# Patient Record
Sex: FEMALE | Race: WHITE | URBAN - METROPOLITAN AREA
[De-identification: names, ages, dates, MRNs, and addresses within clinical notes are randomized per-mention and may not be internally consistent; named-entity substitution may affect disease eponyms.]

---

## 2017-02-01 ENCOUNTER — OFFICE VISIT (OUTPATIENT)
Dept: OBGYN | Facility: CLINIC | Age: 37
End: 2017-02-01
Payer: COMMERCIAL

## 2017-02-01 VITALS
WEIGHT: 151.6 LBS | HEIGHT: 63 IN | SYSTOLIC BLOOD PRESSURE: 110 MMHG | HEART RATE: 86 BPM | DIASTOLIC BLOOD PRESSURE: 74 MMHG | RESPIRATION RATE: 16 BRPM | BODY MASS INDEX: 26.86 KG/M2

## 2017-02-01 DIAGNOSIS — Z13.220 ENCOUNTER FOR LIPID SCREENING FOR CARDIOVASCULAR DISEASE: ICD-10-CM

## 2017-02-01 DIAGNOSIS — Z12.4 SCREENING FOR MALIGNANT NEOPLASM OF CERVIX: ICD-10-CM

## 2017-02-01 DIAGNOSIS — Z23 NEED FOR PROPHYLACTIC VACCINATION AND INOCULATION AGAINST INFLUENZA: ICD-10-CM

## 2017-02-01 DIAGNOSIS — Z13.29 SCREENING FOR THYROID DISORDER: ICD-10-CM

## 2017-02-01 DIAGNOSIS — Z13.6 ENCOUNTER FOR LIPID SCREENING FOR CARDIOVASCULAR DISEASE: ICD-10-CM

## 2017-02-01 DIAGNOSIS — Z11.3 SCREEN FOR STD (SEXUALLY TRANSMITTED DISEASE): ICD-10-CM

## 2017-02-01 DIAGNOSIS — Z01.411 ENCOUNTER FOR GYNECOLOGICAL EXAMINATION WITH ABNORMAL FINDING: Primary | ICD-10-CM

## 2017-02-01 DIAGNOSIS — Z23 NEED FOR TDAP VACCINATION: ICD-10-CM

## 2017-02-01 DIAGNOSIS — Z13.21 ENCOUNTER FOR VITAMIN DEFICIENCY SCREENING: ICD-10-CM

## 2017-02-01 LAB
MICRO REPORT STATUS: ABNORMAL
SPECIMEN SOURCE: ABNORMAL
TSH SERPL DL<=0.05 MIU/L-ACNC: 0.78 MU/L (ref 0.4–4)
WET PREP SPEC: ABNORMAL

## 2017-02-01 PROCEDURE — 90715 TDAP VACCINE 7 YRS/> IM: CPT | Performed by: ADVANCED PRACTICE MIDWIFE

## 2017-02-01 PROCEDURE — 87491 CHLMYD TRACH DNA AMP PROBE: CPT | Performed by: ADVANCED PRACTICE MIDWIFE

## 2017-02-01 PROCEDURE — 87210 SMEAR WET MOUNT SALINE/INK: CPT | Performed by: ADVANCED PRACTICE MIDWIFE

## 2017-02-01 PROCEDURE — 36415 COLL VENOUS BLD VENIPUNCTURE: CPT | Performed by: ADVANCED PRACTICE MIDWIFE

## 2017-02-01 PROCEDURE — 87340 HEPATITIS B SURFACE AG IA: CPT | Performed by: ADVANCED PRACTICE MIDWIFE

## 2017-02-01 PROCEDURE — 99385 PREV VISIT NEW AGE 18-39: CPT | Mod: 25 | Performed by: ADVANCED PRACTICE MIDWIFE

## 2017-02-01 PROCEDURE — G0124 SCREEN C/V THIN LAYER BY MD: HCPCS | Performed by: ADVANCED PRACTICE MIDWIFE

## 2017-02-01 PROCEDURE — 90472 IMMUNIZATION ADMIN EACH ADD: CPT | Performed by: ADVANCED PRACTICE MIDWIFE

## 2017-02-01 PROCEDURE — 99213 OFFICE O/P EST LOW 20 MIN: CPT | Mod: 25 | Performed by: ADVANCED PRACTICE MIDWIFE

## 2017-02-01 PROCEDURE — 87591 N.GONORRHOEAE DNA AMP PROB: CPT | Performed by: ADVANCED PRACTICE MIDWIFE

## 2017-02-01 PROCEDURE — 90686 IIV4 VACC NO PRSV 0.5 ML IM: CPT | Performed by: ADVANCED PRACTICE MIDWIFE

## 2017-02-01 PROCEDURE — 86780 TREPONEMA PALLIDUM: CPT | Performed by: ADVANCED PRACTICE MIDWIFE

## 2017-02-01 PROCEDURE — 87624 HPV HI-RISK TYP POOLED RSLT: CPT | Performed by: ADVANCED PRACTICE MIDWIFE

## 2017-02-01 PROCEDURE — 87389 HIV-1 AG W/HIV-1&-2 AB AG IA: CPT | Performed by: ADVANCED PRACTICE MIDWIFE

## 2017-02-01 PROCEDURE — 90471 IMMUNIZATION ADMIN: CPT | Performed by: ADVANCED PRACTICE MIDWIFE

## 2017-02-01 PROCEDURE — 82306 VITAMIN D 25 HYDROXY: CPT | Performed by: ADVANCED PRACTICE MIDWIFE

## 2017-02-01 PROCEDURE — G0145 SCR C/V CYTO,THINLAYER,RESCR: HCPCS | Performed by: ADVANCED PRACTICE MIDWIFE

## 2017-02-01 PROCEDURE — 84443 ASSAY THYROID STIM HORMONE: CPT | Performed by: ADVANCED PRACTICE MIDWIFE

## 2017-02-01 RX ORDER — METRONIDAZOLE 500 MG/1
500 TABLET ORAL 2 TIMES DAILY
Qty: 14 TABLET | Refills: 0 | Status: SHIPPED | OUTPATIENT
Start: 2017-02-01 | End: 2017-02-22

## 2017-02-01 NOTE — MR AVS SNAPSHOT
After Visit Summary   2/1/2017    Angela Vicente    MRN: [id number]           Patient Information     Date Of Birth          1980        Visit Information        Provider Department      2/1/2017 4:20 PM Nina Hamilton APRN CNM [facility]        Today's Diagnoses     Screen for STD (sexually transmitted disease)    -  1     Screening for malignant neoplasm of cervix         Need for Tdap vaccination         Need for prophylactic vaccination and inoculation against influenza           Care Instructions    Preventive Health Recommendations  Female Ages 21 - 39  Yearly exam:   See your health care provider every year in order to  1. Review health changes.  2. Discuss preventive care.    3. Review your medicines if you your provider has prescribed any.      You do not need a Pap test if your uterus was removed (hysterectomy) and you have not had cancer.    You should be tested each year for STIs (sexually transmitted diseases) if you're at risk.     Talk to your provider about how often to have your cholesterol checked, about every 5 years if normal.    If you are at risk for diabetes, you should have a diabetes test (fasting glucose).    Vitamin D deficiency screening and thyroid disease screening is also recommended every 3-5 years depending on risk factors or more often if symptomatic  PAP Smear:   Until age 30: Get a Pap test every three years (more often if you have had an abnormal result)    After age 30: Talk to your provider about whether you should have a Pap test every 3 years or have a Pap test with HPV screening every 5 years.   Shots: Get a flu shot each year. Get a tetanus shot every 10 years.   Nutrition:     Eat at least 5 servings of fruits and vegetables each day    Eat REAL food, stay away from processed foods.    Eat whole-grain bread, whole-wheat pasta and brown rice instead of white grains and rice.    For bone health:  Eat calcium-rich foods  or take calcium pills (500 to 600 mg) twice a day with food (1200 mg per day). Also take vitamin D (2000 IUs) each day.     Lifestyle    Exercise regularly (30 minutes a day, 5 days of the week). This will help you control your weight and prevent disease.    Weight bearing exercise such as weight lifting, walking, running and yoga will be beneficial later in life preventing osteoporosis     Limit alcohol to one drink per day.    No smoking.     Wear sunscreen to prevent skin cancer.    See your dentist every six months to one year for an exam and cleaning depending on their recommendation    Get an eye exam every two years or more often if you wear glasses or contacts.              Follow-ups after your visit        Who to contact     If you have questions or need follow up information about today's clinic visit or your schedule please contact [facility] directly at [phone].  Normal or non-critical lab and imaging results will be communicated to you by MyChart, letter or phone within 4 business days after the clinic has received the results. If you do not hear from us within 7 days, please contact the clinic through [employer] or phone. If you have a critical or abnormal lab result, we will notify you by phone as soon as possible.  Submit refill requests through [employer] or call your pharmacy and they will forward the refill request to us. Please allow 3 business days for your refill to be completed.          Additional Information About Your Visit        MyChart Information     [employer] gives you secure access to your electronic health record. If you see a primary care provider, you can also send messages to your care team and make appointments. If you have questions, please call your primary care clinic.  If you do not have a primary care provider, please call [phone] and they will assist you.        Care EveryWhere ID     This is your Care EveryWhere ID. This could be used by other  "organizations to access your [place] medical records  [id number]        Your Vitals Were     Pulse Respirations Height BMI (Body Mass Index) Last Period       86 16 5' 3.25\" (1.607 m) 26.63 kg/m2 01/15/2017 (Approximate)        Blood Pressure from Last 3 Encounters:   02/01/17 110/74   09/19/16 118/80   04/11/16 90/62    Weight from Last 3 Encounters:   02/01/17 151 lb 9.6 oz (68.765 kg)   09/19/16 149 lb 11.2 oz (67.903 kg)   04/11/16 151 lb 11.2 oz (68.811 kg)              We Performed the Following     ANTI TREPONEMA EIA     CHLAMYDIA TRACHOMATIS PCR     FLU VAC, SPLIT VIRUS IM > 3 YO (QUADRIVALENT) [12456]     HEPATITIS B SURFACE ANTIGEN     HIV Antigen Antibody Combo     HPV High Risk Types DNA Cervical     NEISSERIA GONORRHOEA PCR     Pap imaged thin layer screen with HPV - recommended age 30 - 65 years (select HPV order below)     TDAP (ADACEL AGES 11-64)     Vaccine Administration, Initial [42760]     WET PREP        Primary Care Provider Office Phone # Fax #    Deqa Meaghan Keys [phone]       [address] [facility] [address]        Thank you!     Thank you for choosing [facility]  for your care. Our goal is always to provide you with excellent care. Hearing back from our patients is one way we can continue to improve our services. Please take a few minutes to complete the written survey that you may receive in the mail after your visit with us. Thank you!             Your Updated Medication List - Protect others around you: Learn how to safely use, store and throw away your medicines at www.disposemymeds.org.          This list is accurate as of: 2/1/17  5:02 PM.  Always use your most recent med list.                   Brand Name Dispense Instructions for use    ALEVE 220 MG tablet   Generic drug:  naproxen sodium      Take 220 mg by mouth 2 times daily (with meals)       gabapentin 100 MG capsule    NEURONTIN    90 capsule "    Take 1 capsule (100 mg) by mouth 3 times daily       Lara 500 MG Caps      Take 2 tablets by mouth daily       METHADONE HCL PO      Take 95 mLs by mouth daily [facility]       order for DME     1 Device    Equipment being ordered: left wrist brace       * venlafaxine 37.5 MG Tb24 24 hr tablet    EFFEXOR-ER    46 tablet    Take 1 tablet daily for 14 days, then  Take 2 tablets daily       * venlafaxine 75 MG Tb24 24 hr tablet    EFFEXOR-ER    30 tablet    Take 1 tablet (75 mg) by mouth daily       * Notice:  This list has 2 medication(s) that are the same as other medications prescribed for you. Read the directions carefully, and ask your doctor or other care provider to review them with you.

## 2017-02-01 NOTE — PATIENT INSTRUCTIONS
Preventive Health Recommendations  Female Ages 21 - 39  Yearly exam:   See your health care provider every year in order to  1. Review health changes.  2. Discuss preventive care.    3. Review your medicines if you your provider has prescribed any.      You do not need a Pap test if your uterus was removed (hysterectomy) and you have not had cancer.    You should be tested each year for STIs (sexually transmitted diseases) if you're at risk.     Talk to your provider about how often to have your cholesterol checked, about every 5 years if normal.    If you are at risk for diabetes, you should have a diabetes test (fasting glucose).    Vitamin D deficiency screening and thyroid disease screening is also recommended every 3-5 years depending on risk factors or more often if symptomatic  PAP Smear:   Until age 30: Get a Pap test every three years (more often if you have had an abnormal result)    After age 30: Talk to your provider about whether you should have a Pap test every 3 years or have a Pap test with HPV screening every 5 years.   Shots: Get a flu shot each year. Get a tetanus shot every 10 years.   Nutrition:     Eat at least 5 servings of fruits and vegetables each day    Eat REAL food, stay away from processed foods.    Eat whole-grain bread, whole-wheat pasta and brown rice instead of white grains and rice.    For bone health:  Eat calcium-rich foods or take calcium pills (500 to 600 mg) twice a day with food (1200 mg per day). Also take vitamin D (2000 IUs) each day.     Lifestyle    Exercise regularly (30 minutes a day, 5 days of the week). This will help you control your weight and prevent disease.    Weight bearing exercise such as weight lifting, walking, running and yoga will be beneficial later in life preventing osteoporosis     Limit alcohol to one drink per day.    No smoking.     Wear sunscreen to prevent skin cancer.    See your dentist every six months to one year for an exam and cleaning  depending on their recommendation    Get an eye exam every two years or more often if you wear glasses or contacts.

## 2017-02-01 NOTE — PROGRESS NOTES
Quick Note:    Results discussed directly with patient while patient was present. Any further details documented in the note.   CAROLYN Tovar CNM  ______

## 2017-02-01 NOTE — NURSING NOTE
"Chief Complaint   Patient presents with     Physical       Initial /74 mmHg  Pulse 86  Resp 16  Ht 5' 3.25\" (1.607 m)  Wt 151 lb 9.6 oz (68.765 kg)  BMI 26.63 kg/m2  LMP 01/15/2017 (Approximate) Estimated body mass index is 26.63 kg/(m^2) as calculated from the following:    Height as of this encounter: 5' 3.25\" (1.607 m).    Weight as of this encounter: 151 lb 9.6 oz (68.765 kg).  BP completed using cuff size: regular        The following HM Due: pap smear  Pt here for physical exam  Not sure when last pap /no Hx abnormal  Has hx irreg menses  spots for a few days than a few weeks later bleeds  Has been going on for years  Has a IUD thinks might be a Paraguard. Not sure when was inserted  Is a recovering drug addict  Doing good  Dora Call LPN                      "

## 2017-02-01 NOTE — PROGRESS NOTES
Virginia is a 36 year old  female who presents for annual exam.     HPI:  Besides routine health maintenance, she would like to discuss her IUD, not sure how long it has been placed or what kind it was. She reports is was placed after she had an  in  when she was still using heroin. Has had some irregular spotting and bleeding between cycles for the past couple years. Would like to discuss other options.  She is in a supportive relationship. She feels she is finally doing well, managed addiction with methadone clinic.  Unsure of last pap and STD screening.  Mood is stable with medications and management by psychiatry.  Menses are regular q 28-30 days and with spotting at the beginning of the month and menses mid month  crampy and heavy lasting 6 days.   Menses flow: medium and heavy.    Patient's last menstrual period was 01/15/2017 (approximate)..   Using IUD for contraception.    She is not currently considering pregnancy.    REPRODUCTIVE/GYNECOLOGIC HISTORY:  Virginia is sexually active with male partner(s) and is currently in monogamous relationship.   STI testing offered?  Accepted  History of abnormal Pap smear:  unsure  SOCIAL HISTORY  Abuse: does not report having previously been physical or sexually abused.    Do you feel safe in your environment? YES    She  reports that she has quit smoking. Her smoking use included Cigarettes. She has never used smokeless tobacco.    STD testing offered?  Accepted  Alcohol Score = 0    HEALTH MAINTENANCE:  Regular Self Breast Exams: No  TSH: (  TSH   Date Value Ref Range Status   2012 0.27* 0.4 - 5.0 mU/L Final    )  Pap; (No results found for this basename: pap )  Immunizations up to date: yes, done today  Health maintenance updated:  yes    HEALTHY HABITS  Eating habits: eats regular meals  Calcium/Vitamin D intake: source:  none Adequate?   Exercise: How often do you exercise? 3-5 times/week; going to the gym  Have you had an eye exam in the  last two years? No will f/u with insurance and see where would be covered  Do you routinely see the dentist once or twice yearly? No, also on her list of things to do      HISTORY:  Obstetric History       T0      TAB0   SAB0   E0   M0   L0       # Outcome Date GA Lbr Solitario/2nd Weight Sex Delivery Anes PTL Lv   1 AB                 Past Medical History   Diagnosis Date     Concussion      high school cheer leading     Depression with anxiety 2016     Heroin addiction (H)- on methadone rx ([facility]) 2016     Past Surgical History   Procedure Laterality Date     Right uteral stent       Laser holmium lithotripsy ureter(s), insert stent, combined  2012     Procedure: COMBINED CYSTOSCOPY, URETEROSCOPY, LASER HOLMIUM LITHOTRIPSY URETER(S), INSERT STENT;  CYSTOSCOPY, RIGHT URETEROSCOPY, HOLMIUM LASER, RIGHT URETERAL STONE EXTRACTION , RIGHT RETROGRADES AND RIGHT STENT PLACEMENT;  Surgeon: Gil Bar MD;  Location:  OR     Cystoscopy, retrogrades, extract stone, combined  2012     Procedure: COMBINED CYSTOSCOPY, RETROGRADES, EXTRACT STONE;;  Surgeon: Gil Bar MD;  Location:  OR     ----------incision and drainage       left wrist      Family History   Problem Relation Age of Onset     Depression Mother      PTSD anxiety     Anxiety Disorder Mother      Bipolar Disorder Mother      Alcohol/Drug Father      Depression Father      Anxiety Disorder Father      Substance Abuse Father      HEART DISEASE Paternal Grandfather      Anxiety Disorder Sister      Social History     Social History     Marital Status: Single     Spouse Name: N/A     Number of Children: N/A     Years of Education: N/A     Social History Main Topics     Smoking status: Former Smoker     Types: Cigarettes     Smokeless tobacco: Never Used     Alcohol Use: No     Drug Use: No      Comment: heroin - stopped heroin in 2015 and was put on Methadone      Sexual Activity: Not Asked  "    Other Topics Concern     None     Social History Narrative       Current outpatient prescriptions:      metroNIDAZOLE (FLAGYL) 500 MG tablet, Take 1 tablet (500 mg) by mouth 2 times daily, Disp: 14 tablet, Rfl: 0     naproxen sodium (ALEVE) 220 MG tablet, Take 220 mg by mouth 2 times daily (with meals), Disp: , Rfl:      Lara 500 MG CAPS, Take 2 tablets by mouth daily, Disp: , Rfl:      gabapentin (NEURONTIN) 100 MG capsule, Take 1 capsule (100 mg) by mouth 3 times daily, Disp: 90 capsule, Rfl: 1     venlafaxine (EFFEXOR-ER) 37.5 MG TB24, Take 1 tablet daily for 14 days, then  Take 2 tablets daily, Disp: 46 tablet, Rfl: 0     METHADONE HCL PO, Take 95 mLs by mouth daily [facility], Disp: , Rfl:      venlafaxine (EFFEXOR-ER) 75 MG TB24, Take 1 tablet (75 mg) by mouth daily, Disp: 30 tablet, Rfl: 1     order for DME, Equipment being ordered: left wrist brace, Disp: 1 Device, Rfl: 0   No Known Allergies    Past medical, surgical, social and family history were reviewed and updated in EPIC.    ROS:   12 point review of systems negative other than symptoms noted below.  Genitourinary: Irregular Menses    PHYSICAL EXAM:  /74 mmHg  Pulse 86  Resp 16  Ht 5' 3.25\" (1.607 m)  Wt 151 lb 9.6 oz (68.765 kg)  BMI 26.63 kg/m2  LMP 01/15/2017 (Approximate)   BMI: Body mass index is 26.63 kg/(m^2).  Constitutional: healthy, alert and no distress  Neck: symmetrical, thyroid normal size, no masses present, lymphadenopathy present.   Cardiovascular: RRR, no murmurs present  Respiratory: breathing unlabored, lungs CTA bilaterally  Breast:normal without masses, tenderness or nipple discharge and no palpable axillary masses or adenopathy  Gastrointestinal: abdomen soft, non-tender, bowel sounds present  PELVIC EXAM:  Vulva: No lesions, no adenopathy, BUS WNL  Vagina: Moist, pink, discharge consistent with BV and malodorous  well rugated, no lesions  Cervix:smooth, pink, no visible lesions, pap/GC " obtained, IUD strings visualized about 5 cm out of os  Uterus: Normal size, non-tender, mobile  Ovaries: No masses palpated  Rectal exam: deferred    ASSESSMENT/PLAN:    ICD-10-CM    1. Encounter for gynecological examination with abnormal finding Z01.411 metroNIDAZOLE (FLAGYL) 500 MG tablet   2. Screen for STD (sexually transmitted disease) Z11.3 WET PREP     NEISSERIA GONORRHOEA PCR     HEPATITIS B SURFACE ANTIGEN     HIV Antigen Antibody Combo     ANTI TREPONEMA EIA     CHLAMYDIA TRACHOMATIS PCR   3. Screening for malignant neoplasm of cervix Z12.4 Pap imaged thin layer screen with HPV - recommended age 30 - 65 years (select HPV order below)     HPV High Risk Types DNA Cervical   4. Need for Tdap vaccination Z23 TDAP (ADACEL AGES 11-64)   5. Need for prophylactic vaccination and inoculation against influenza Z23 FLU VAC, SPLIT VIRUS IM > 3 YO (QUADRIVALENT) [35439]     Vaccine Administration, Initial [46956]   6. Encounter for vitamin deficiency screening Z13.21 Vitamin D Deficiency   7. Screening for thyroid disorder Z13.29 TSH   8. Encounter for lipid screening for cardiovascular disease Z13.220 Lipid Profile (Chol, Trig, HDL, LDL calc)    Z13.6      Results for orders placed or performed in visit on 02/01/17   WET PREP   Result Value Ref Range    Specimen Description Vagina     Wet Prep (A)      No Trichomonas seen  Clue cells seen  No yeast seen      Micro Report Status FINAL 02/01/2017          COUNSELING:   Reviewed preventive health counseling, as reflected in patient instructions       Regular exercise       Healthy diet/nutrition       Vision screening       Hearing screening       Vaccinated for: Influenza and TDAP       Contraception       Family planning       Osteoporosis Prevention/Bone Health       Safe sex practices/STD prevention  Plans to return to clinic in 2 weeks for Nexplanon placement, handout provided  Until appointment use condoms  Encouraged to make dental and vision  "appointments  Recommend starting multivitamin, vitamin d, and fish oil   Encouraged to continue exercise routine  +BV on wet prep, abstain from intercourse while treating BV, patient does not drink alcohol  Plans lipid panel fasting with nexplanon placement    IUD removal note   IUD removal requested by patient during annual exam.   She does not know which type of IUD but believes it was placed around 2012. Plans nexplanon placement in 2 weeks    BP 98/58 mmHg  Pulse 68  Ht 5' 5\" (1.651 m)  Wt 180 lb (81.647 kg)  BMI 29.95 kg/m2  SpO2 100%  LMP 12/15/2016    PELVIC EXAM:  Vulva: No external lesions, normal hair distribution, no adenopathy, BUS WNL  Vagina: Moist, pink, no abnormal discharge, well rugated, no lesions  Cervix: visualized anteriorly, string visualized about 5 cm, smooth, pink, no visible lesions, neg CMT     PROCEDURE: String grasped with ring forceps and removed without difficuly  Patient tolerated procedure well without pain or discomfort    PLAN/COUNSELING:  See above               45 minutes was spent face to face with the patient today discussing her history, diagnosis, and follow-up plan as noted above. Over 50% of the visit was spent in counseling and coordination of care.    Total Visit Time: 45 minutes.         CAROLYN Castle, MICHAEL            "

## 2017-02-01 NOTE — PROGRESS NOTES
Injectable Influenza Immunization Documentation    1.  Is the person to be vaccinated sick today?  No    2. Does the person to be vaccinated have an allergy to eggs or to a component of the vaccine?  No    3. Has the person to be vaccinated today ever had a serious reaction to influenza vaccine in the past?  No    4. Has the person to be vaccinated ever had Guillain-[place] syndrome?  No     Form completed by Dora Call LPN

## 2017-02-01 NOTE — LETTER
[address] [address]  [phone]        February 6, 2018    Angela Vicente  [address]  [facility] [address]              Dear Angela Vicente    This is to remind you that your fasting labs is due.    You may call our office at [phone] to schedule an appointment.    Please disregard this notice if you have already had your labs drawn or made an appointment.        Sincerely,        Nina Hamilton MD

## 2017-02-03 LAB
C TRACH DNA SPEC QL NAA+PROBE: NORMAL
DEPRECATED CALCIDIOL+CALCIFEROL SERPL-MC: 22 UG/L (ref 20–75)
HBV SURFACE AG SERPL QL IA: NONREACTIVE
HIV 1+2 AB+HIV1 P24 AG SERPL QL IA: NORMAL
N GONORRHOEA DNA SPEC QL NAA+PROBE: NORMAL
SPECIMEN SOURCE: NORMAL
SPECIMEN SOURCE: NORMAL
T PALLIDUM IGG+IGM SER QL: NEGATIVE

## 2017-02-07 LAB
COPATH REPORT: NORMAL
PAP: NORMAL

## 2017-02-09 LAB
FINAL DIAGNOSIS: NORMAL
HPV HR 12 DNA CVX QL NAA+PROBE: NEGATIVE
HPV16 DNA SPEC QL NAA+PROBE: NEGATIVE
HPV18 DNA SPEC QL NAA+PROBE: NEGATIVE
SPECIMEN DESCRIPTION: NORMAL

## 2017-02-22 ENCOUNTER — OFFICE VISIT (OUTPATIENT)
Dept: OBGYN | Facility: CLINIC | Age: 37
End: 2017-02-22
Payer: COMMERCIAL

## 2017-02-22 VITALS
DIASTOLIC BLOOD PRESSURE: 64 MMHG | WEIGHT: 146 LBS | OXYGEN SATURATION: 98 % | BODY MASS INDEX: 25.66 KG/M2 | SYSTOLIC BLOOD PRESSURE: 106 MMHG | HEART RATE: 78 BPM

## 2017-02-22 DIAGNOSIS — Z30.017 NEXPLANON INSERTION: Primary | ICD-10-CM

## 2017-02-22 DIAGNOSIS — Z30.017 ENCOUNTER FOR INITIAL PRESCRIPTION OF NEXPLANON: ICD-10-CM

## 2017-02-22 LAB — HCG, QUAL URINE: NEGATIVE

## 2017-02-22 PROCEDURE — 84703 CHORIONIC GONADOTROPIN ASSAY: CPT | Performed by: NURSE PRACTITIONER

## 2017-02-22 PROCEDURE — 11981 INSERTION DRUG DLVR IMPLANT: CPT | Performed by: NURSE PRACTITIONER

## 2017-02-22 RX ORDER — LAMOTRIGINE 200 MG/1
200 TABLET ORAL DAILY
COMMUNITY
Start: 2017-01-30

## 2017-02-22 NOTE — PATIENT INSTRUCTIONS
What Nexplanon Users May Expect    Nexplanon is well tolerated and has a low early-removal rate.    Insertion site complications, such as prolonged pain or infection, are rare. Removal is occasionally difficult, and rarely requires a surgical procedure in the operating room.    Menstrual changes are common with Nexplanon. Bleeding may become more or less frequent or heavy, or absent. The bleeding pattern after the first three months is predictive of future bleeding, but the pattern may change at any time. Average bleeding is 18 days over 3 months. Over 50% of women experience rare over absent bleeding over the two year period, while 25% experience frequent or prolonged bleeding.    In clinical studies, users gained 3.7 pounds over two years. It is unknown what portion of this weight gain is related to Nexplanon    Women with a history of depressed mood may have worsening on Nexplanon, and may need to have the device removed.    Return to baseline ovulation patterns is seen 7-14 days after removal of Nexplanon.    Rarely, headaches and acne have also led to device removal.    Nexplanon may be less effective in women who weight more than 130% of their ideal body weight.    Nexplanon does not protect against HIV or STDs.    Use a back-up method of birth control for the first 7 days after insertion of Nexplanon.    Please call [facility] at [phone] if you have questions or concerns.    For more complete information:  [email or web address]

## 2017-02-22 NOTE — NURSING NOTE
"Chief Complaint   Patient presents with     Contraception   Was considering Nexplanon but now possibly would like Paraguard IUD due to changes in mood from hormones  Initial /64  Pulse 78  Wt 146 lb (66.2 kg)  LMP 02/21/2017 (Exact Date)  SpO2 98%  BMI 25.66 kg/m2 Estimated body mass index is 25.66 kg/(m^2) as calculated from the following:    Height as of 2/1/17: 5' 3.25\" (1.607 m).    Weight as of this encounter: 146 lb (66.2 kg).  Medication Reconciliation: complete   Dina Plunkett MA      "

## 2017-02-22 NOTE — PROGRESS NOTES
NEXPLANON INSERTION PROCEDURE    Angela Vicente is a 36 year old  who presents for Nexplanon insertion. The  patient's last menstrual period was 2017.  The patient is currently using Nothing  for contraception.  She had Mirena IUD removed approximately 1 month ago.    Tests:  Results for orders placed or performed in visit on 17 (from the past 24 hour(s))   HCL HCG, URINE, NURSE BACKOFFICE   Result Value Ref Range    hCG, Qual Urine negative      A complete discussion of the risks and benefits of nexplanon use and the details of the insertion procedure was held with the patient.  All questions were answered.  A consent form was signed.      Prior to the beginning of the procedure the team paused to verify the patient's identity, as well as the procedure to be performed and the correct side/site.  All equipment required was ready and available. The patient was positioned appropriately.     Preprocedure medications: 1% plain lidocaine, 1-2 ml  Nexplanon Lot # [id number]    Patients allergies were confirmed.  The patient was placed in the supine position with her right (non-dominant) arm flexed at the elbow, externally rotated, and placed with her wrist parallel to her ear.  The insertion site was identified 6-8 cm above the elbow crease at the inner aspect overlying the bicepital groove.  The insertion site was marked with a sterile marker. The direction of insertion was also indicated with a jewell 6-8 cm proximal in the bicepital groove.  The insertion area was cleaned with betadine swabs and anesthetized with 2 cc of 1% lidocaine without epinephrine.  The Nexplanon was removed from its blister.  With the shield on, the joshua was visible inside the needle tip.  The needle shield was removed.  Counter-traction was applied to the skin at the marked needle insertion site.  The tip of the needle was inserted at the site at a slight angle.  The applicator was then lowered to a horizontal position.  The  needle was inserted to its full length, keeping the needle parallel to the surface of the skin and the skin tented. The applicator button was pressed and the the needle retracted within the device leaving the Nexplanon joshua under the skin.  The 4 cm joshua was palpated under the skin.  The patient also palpated the joshua.  A pressure bandage was applied with sterile gauze. The patient was instructed to remove the bandage in several hours and replace with a band-aid.    The user card was filled out and given to the patient to keep.  The Patient Chart Label was completed and sent for scanning.    PLAN:   The patient was asked to contact the clinic for any fever/chills/severe pelvic or abdominal pain or heavy bleeding.     FOLLOW-UP:  She was asked to follow up for any problems.   Patient advised to use back-up birth control for 7 days.    Jenny LEON CNM, [facility]NP[facility]

## 2017-02-22 NOTE — MR AVS SNAPSHOT
After Visit Summary   2/22/2017    Angela Vicente    MRN: [id number]           Patient Information     Date Of Birth          1980        Visit Information        Provider Department      2/22/2017 10:30 AM Jenny Bonds APRN [facility] Instructions    What Nexplanon Users May Expect    Nexplanon is well tolerated and has a low early-removal rate.    Insertion site complications, such as prolonged pain or infection, are rare. Removal is occasionally difficult, and rarely requires a surgical procedure in the operating room.    Menstrual changes are common with Nexplanon. Bleeding may become more or less frequent or heavy, or absent. The bleeding pattern after the first three months is predictive of future bleeding, but the pattern may change at any time. Average bleeding is 18 days over 3 months. Over 50% of women experience rare over absent bleeding over the two year period, while 25% experience frequent or prolonged bleeding.    In clinical studies, users gained 3.7 pounds over two years. It is unknown what portion of this weight gain is related to Nexplanon    Women with a history of depressed mood may have worsening on Nexplanon, and may need to have the device removed.    Return to baseline ovulation patterns is seen 7-14 days after removal of Nexplanon.    Rarely, headaches and acne have also led to device removal.    Nexplanon may be less effective in women who weight more than 130% of their ideal body weight.    Nexplanon does not protect against HIV or STDs.    Use a back-up method of birth control for the first 7 days after insertion of Nexplanon.    Please call [facility] at [phone] if you have questions or concerns.    For more complete information:  [email or web address]        Follow-ups after your visit        Who to contact     If you have questions or need follow up  information about today's clinic visit or your schedule please contact [facility] directly at [phone].  Normal or non-critical lab and imaging results will be communicated to you by MyChart, letter or phone within 4 business days after the clinic has received the results. If you do not hear from us within 7 days, please contact the clinic through [employer] or phone. If you have a critical or abnormal lab result, we will notify you by phone as soon as possible.  Submit refill requests through [employer] or call your pharmacy and they will forward the refill request to us. Please allow 3 business days for your refill to be completed.          Additional Information About Your Visit        [employer] Information     [employer] gives you secure access to your electronic health record. If you see a primary care provider, you can also send messages to your care team and make appointments. If you have questions, please call your primary care clinic.  If you do not have a primary care provider, please call [phone] and they will assist you.        Care EveryWhere ID     This is your Care EveryWhere ID. This could be used by other organizations to access your [facility] medical records  [id number]        Your Vitals Were     Pulse Last Period Pulse Oximetry BMI (Body Mass Index)          78 02/21/2017 (Exact Date) 98% 25.66 kg/m2         Blood Pressure from Last 3 Encounters:   02/22/17 106/64   02/01/17 110/74   09/19/16 118/80    Weight from Last 3 Encounters:   02/22/17 146 lb (66.2 kg)   02/01/17 151 lb 9.6 oz (68.8 kg)   09/19/16 149 lb 11.2 oz (67.9 kg)              Today, you had the following     No orders found for display       Primary Care Provider Office Phone # Fax #    Deqa Meaghan Keys [phone]       [facility] [address]  [place] [address]        Thank you!     Thank you for choosing [facility]  for your care. Our  goal is always to provide you with excellent care. Hearing back from our patients is one way we can continue to improve our services. Please take a few minutes to complete the written survey that you may receive in the mail after your visit with us. Thank you!             Your Updated Medication List - Protect others around you: Learn how to safely use, store and throw away your medicines at www.disposemymeds.org.          This list is accurate as of: 2/22/17 11:35 AM.  Always use your most recent med list.                   Brand Name Dispense Instructions for use    ALEVE 220 MG tablet   Generic drug:  naproxen sodium      Take 220 mg by mouth 2 times daily (with meals)       gabapentin 100 MG capsule    NEURONTIN    90 capsule    Take 1 capsule (100 mg) by mouth 3 times daily       Lara 500 MG Caps      Take 2 tablets by mouth daily       METHADONE HCL PO      Take 95 mLs by mouth daily [facility]       metroNIDAZOLE 500 MG tablet    FLAGYL    14 tablet    Take 1 tablet (500 mg) by mouth 2 times daily       order for DME     1 Device    Equipment being ordered: left wrist brace       * venlafaxine 37.5 MG Tb24 24 hr tablet    EFFEXOR-ER    46 tablet    Take 1 tablet daily for 14 days, then  Take 2 tablets daily       * venlafaxine 75 MG Tb24 24 hr tablet    EFFEXOR-ER    30 tablet    Take 1 tablet (75 mg) by mouth daily       * Notice:  This list has 2 medication(s) that are the same as other medications prescribed for you. Read the directions carefully, and ask your doctor or other care provider to review them with you.

## 2017-04-26 ENCOUNTER — OFFICE VISIT (OUTPATIENT)
Dept: OBGYN | Facility: CLINIC | Age: 37
End: 2017-04-26
Payer: COMMERCIAL

## 2017-04-26 VITALS
HEART RATE: 60 BPM | DIASTOLIC BLOOD PRESSURE: 58 MMHG | BODY MASS INDEX: 24.78 KG/M2 | SYSTOLIC BLOOD PRESSURE: 98 MMHG | OXYGEN SATURATION: 99 % | WEIGHT: 141 LBS

## 2017-04-26 DIAGNOSIS — Z30.09 ENCOUNTER FOR COUNSELING REGARDING CONTRACEPTION: ICD-10-CM

## 2017-04-26 DIAGNOSIS — Z11.3 ROUTINE SCREENING FOR STI (SEXUALLY TRANSMITTED INFECTION): ICD-10-CM

## 2017-04-26 DIAGNOSIS — Z30.46 ENCOUNTER FOR NEXPLANON REMOVAL: Primary | ICD-10-CM

## 2017-04-26 PROBLEM — Z30.017 NEXPLANON INSERTION: Status: RESOLVED | Noted: 2017-02-22 | Resolved: 2017-04-26

## 2017-04-26 PROCEDURE — 11982 REMOVE DRUG IMPLANT DEVICE: CPT | Performed by: NURSE PRACTITIONER

## 2017-04-26 PROCEDURE — 87491 CHLMYD TRACH DNA AMP PROBE: CPT | Performed by: NURSE PRACTITIONER

## 2017-04-26 PROCEDURE — 87591 N.GONORRHOEAE DNA AMP PROB: CPT | Performed by: NURSE PRACTITIONER

## 2017-04-26 RX ORDER — MISOPROSTOL 200 UG/1
200 TABLET ORAL ONCE
Qty: 1 TABLET | Refills: 0 | Status: SHIPPED | OUTPATIENT
Start: 2017-04-26 | End: 2017-04-26

## 2017-04-26 RX ORDER — GABAPENTIN 300 MG/1
CAPSULE ORAL
Refills: 1 | COMMUNITY
Start: 2017-04-12 | End: 2017-04-26

## 2017-04-26 RX ORDER — LAMOTRIGINE 100 MG/1
TABLET ORAL
Refills: 4 | COMMUNITY
Start: 2017-03-20

## 2017-04-26 RX ORDER — IBUPROFEN 600 MG/1
600 TABLET, FILM COATED ORAL EVERY 6 HOURS PRN
Qty: 60 TABLET | Refills: 1 | Status: SHIPPED | OUTPATIENT
Start: 2017-04-26

## 2017-04-26 NOTE — NURSING NOTE
"Chief Complaint   Patient presents with     Contraception   would like nexplanon removed because she does not like the hormones  Initial BP 98/58 (BP Location: Right arm, Patient Position: Chair, Cuff Size: Adult Regular)  Pulse 60  Wt 141 lb (64 kg)  SpO2 99%  BMI 24.78 kg/m2 Estimated body mass index is 24.78 kg/(m^2) as calculated from the following:    Height as of 2/1/17: 5' 3.25\" (1.607 m).    Weight as of this encounter: 141 lb (64 kg).  Medication Reconciliation: complete   Dina Plunkett MA      "

## 2017-04-26 NOTE — PATIENT INSTRUCTIONS
Birth Control Methods  What is contraception?   Birth control and contraception are terms used to refer to ways to prevent pregnancy. There are many ways to prevent pregnancy when you have sexual intercourse. They include the use of hormone medicines, contraceptive devices (barriers and IUDs), periods of avoiding sex, spermicides, withdrawal, and devices and surgery for sterilization. Some birth control methods work better than others.  You may want to choose a kind of birth control that will also help keep you from getting sexually transmitted disease (STD). Sometimes you may need to use more than one method to try to prevent pregnancy AND infection. You can use latex or polyurethane male condoms or the female condom to try to avoid getting STDs. They are the only birth control methods that will lessen your risk of being infected with HIV, the virus that causes AIDS. Hormones, natural family planning, and withdrawal do not give any protection against infection.  What are the different methods of contraception?   Hormone Medicines  Birth control pills, shots, vaginal rings, skin patches, and implants contain manmade forms of the hormones estrogen and/or progesterone. The hormones stop a woman's ovaries from releasing an egg each month. They also make it harder for sperm to get into the uterus or for a fertilized egg to stay in the uterus.  Birth control pills are taken according to a daily schedule prescribed by your healthcare provider.   One shot of Depo-Provera, which contains progesterone, can prevent pregnancy for 3 months.   Vaginal rings are flexible rings put into the vagina. The rings release hormones into the body. A ring stays in the vagina for 3 weeks. Then it is removed. After 1 week a new ring is put into the vagina and the cycle is repeated.   Patches containing hormones may be put on the skin. A new patch is worn every week for 3 weeks. During the 4th week, no patch is worn. Then the  cycle is repeated.   The implant (Implanon) is a small, thin capsule containing progesterone. It is put under the skin of a woman's arm. The implant prevents pregnancy for up to 3 years.  You will need to see your healthcare provider to get any of these hormonal forms of birth control.  Contraceptive Devices  Most contraceptive devices form physical or chemical barriers that stop sperm from getting into the uterus.  The male condom is a tube of thin material. (Latex rubber or polyurethane is best.) It is rolled over the erect penis before the penis touches any part of a woman's genital area. The male condom is the best protection against STDs, including HIV and hepatitis B.   The female condom is a 7-inch-long pouch. The pouch is made of polyurethane. It has 2 flexible rings. It is inserted into the vagina before sex. The female condom covers the cervix, vagina, and area around the vagina. It may protect against some STDs, such as HIV and hepatitis B.   Spermicides are chemicals that kill sperm. They are available as foam, jelly, foaming tablets, vaginal suppositories, or cream. They are inserted into the vagina no more than 30 minutes before sex. Spermicides should NOT be used alone. They work much better when they are used with another form of birth control, such as a condom or diaphragm. Spermicides do not protect against STDs.   The sponge is a round, soft piece of polyurethane foam. It is soaked with a spermicide. No more than 24 hours before intercourse, the sponge is dampened with water and inserted into the vagina against the cervix.   The diaphragm is a soft rubber dome stretched over a flexible ring. No more than 6 hours before sex, you fill the diaphragm with a spermicidal jelly or cream and put it into the vagina.   The intrauterine device (IUD) is a small plastic device containing copper or hormones. The IUD prevents the egg from being fertilized or implanting and growing in the uterus. An IUD is put  into the uterus by your healthcare provider. Depending on the type, it may be kept in the uterus 5 to 10 years before it must be replaced with a new one.  You can buy condoms, spermicides, and sponges at drug and grocery stores without a prescription. A diaphragm needs to be fitted by a healthcare provider. If you choose to use an IUD, you will need to see your provider for insertion of the IUD.  Sterilization  Sterilization is a procedure done to close the tubes that carry the sperm or eggs. It may be done with surgery or special devices put into the tubes. A woman or man who is sterilized will no longer be able to have children. These procedures are usually permanent methods of birth control.   Removal of the uterus (hysterectomy) causes a woman to be sterile and unable to get pregnant. Hysterectomy is usually only done if there are problems with the female organs, such as fibroids or abnormal menstrual bleeding.  Natural Family Planning (Periodic Abstinence)  The natural family planning methods of birth control do not use any devices, drugs, or surgery. To prevent pregnancy you avoid having sex on certain days of each menstrual cycle when a women is fertile which is determined by observing vaginal mucous signs, and confirming with the temperature rise that comes with ovulation and keeping track of monthly period patterns.  It is recommended to take classes to learn this process.  Withdrawal Method  The withdrawal method involves removing the penis from the vagina before ejaculation, when semen starts coming out. Often sperm get into the vagina before or during withdrawal. This method is unreliable. It often does not prevent pregnancy.  Emergency Contraception  A pill for emergency birth control may be taken to prevent pregnancy soon after you have had sex without birth control. It may also be used when another method of birth control has failed (for example, if a condom breaks). The pill contains a hormone that  will prevent pregnancy if it is taken very soon after intercourse (within 3 to 5 days, depending on the medicine). In many areas, the pills are available at drug stores without a prescription for women over 18 years old.   A copper intrauterine device (IUD) is another type of emergency birth control. The IUD may be put into the uterus by your healthcare provider within 5 days after unprotected sex. It may prevent pregnancy by stopping fertilization or keeping a fertilized egg from attaching to the womb.  How well do the various methods prevent pregnancy?   The following chart shows the typical failure rates of the different kinds of birth control methods. The failure rate is the number of pregnancies expected per 100 women during 1 year of using each method. The failure rate can depend on how correctly each method is followed. If a method is used perfectly, the failure rate is lower than the typical rate shown here. Use of more than 1 method (for example, birth control pills and condoms) can decrease the chances of failure.                               Percentage of Women Having an     Birth Control             Unintended Pregnancy within the        Method                       First Year of Use    -----------------------------------------------------------                                   Typical Use    Perfect Use    -----------------------------------------------------------   Spermicides                         29%          18%   Natural Family Planning     (Periodic Abstinence)                           9%     Symptothermal method                            2%   Withdrawal                          18%           4%   Diaphragm with Spermicide           16%           6%   Condom     Female                            27%           5%     Male                              17%           2%   Sponge     Women who have given birth        32%          26%     Women who have not given birth    16%           9%   Pill                                  9%           0.3%   IUD     with copper                        1%           0.6%     with hormones                      0.1%         0.1%   Shot (Depo-Provera)                  7%           0.3%   Implant                              1%           0.05%   Patch (Ortho Evra)                   8%           0.3%   Vaginal ring (NuvaRing)              8%           0.3%   Female Sterilization                 0.7%         0.5%   Male Sterilization                   0.2%         0.1%   No Method                           85%          85%   -----------------------------------------------------------      Note: These failure rates are modified from the [employer] [place] January 2008. Facts on Contraceptive Use. Available at http://www.[employer].org/pubs/fb_contr_use.html

## 2017-04-26 NOTE — MR AVS SNAPSHOT
After Visit Summary   4/26/2017    Angela Vicente    MRN: [id number]           Patient Information     Date Of Birth          1980        Visit Information        Provider Department      4/26/2017 9:30 AM Jenny Bonds APRN [facility]        Today's Diagnoses     Encounter for Nexplanon removal    -  1    Encounter for counseling regarding contraception        Routine screening for STI (sexually transmitted infection)          Care Instructions                   Birth Control Methods  What is contraception?   Birth control and contraception are terms used to refer to ways to prevent pregnancy. There are many ways to prevent pregnancy when you have sexual intercourse. They include the use of hormone medicines, contraceptive devices (barriers and IUDs), periods of avoiding sex, spermicides, withdrawal, and devices and surgery for sterilization. Some birth control methods work better than others.  You may want to choose a kind of birth control that will also help keep you from getting sexually transmitted disease (STD). Sometimes you may need to use more than one method to try to prevent pregnancy AND infection. You can use latex or polyurethane male condoms or the female condom to try to avoid getting STDs. They are the only birth control methods that will lessen your risk of being infected with HIV, the virus that causes AIDS. Hormones, natural family planning, and withdrawal do not give any protection against infection.  What are the different methods of contraception?   Hormone Medicines  Birth control pills, shots, vaginal rings, skin patches, and implants contain manmade forms of the hormones estrogen and/or progesterone. The hormones stop a woman's ovaries from releasing an egg each month. They also make it harder for sperm to get into the uterus or for a fertilized egg to stay in the uterus.  Birth control pills are taken according to a daily schedule  prescribed by your healthcare provider.   One shot of Depo-Provera, which contains progesterone, can prevent pregnancy for 3 months.   Vaginal rings are flexible rings put into the vagina. The rings release hormones into the body. A ring stays in the vagina for 3 weeks. Then it is removed. After 1 week a new ring is put into the vagina and the cycle is repeated.   Patches containing hormones may be put on the skin. A new patch is worn every week for 3 weeks. During the 4th week, no patch is worn. Then the cycle is repeated.   The implant (Implanon) is a small, thin capsule containing progesterone. It is put under the skin of a woman's arm. The implant prevents pregnancy for up to 3 years.  You will need to see your healthcare provider to get any of these hormonal forms of birth control.  Contraceptive Devices  Most contraceptive devices form physical or chemical barriers that stop sperm from getting into the uterus.  The male condom is a tube of thin material. (Latex rubber or polyurethane is best.) It is rolled over the erect penis before the penis touches any part of a woman's genital area. The male condom is the best protection against STDs, including HIV and hepatitis B.   The female condom is a 7-inch-long pouch. The pouch is made of polyurethane. It has 2 flexible rings. It is inserted into the vagina before sex. The female condom covers the cervix, vagina, and area around the vagina. It may protect against some STDs, such as HIV and hepatitis B.   Spermicides are chemicals that kill sperm. They are available as foam, jelly, foaming tablets, vaginal suppositories, or cream. They are inserted into the vagina no more than 30 minutes before sex. Spermicides should NOT be used alone. They work much better when they are used with another form of birth control, such as a condom or diaphragm. Spermicides do not protect against STDs.   The sponge is a round, soft piece of polyurethane foam. It is soaked with a  spermicide. No more than 24 hours before intercourse, the sponge is dampened with water and inserted into the vagina against the cervix.   The diaphragm is a soft rubber dome stretched over a flexible ring. No more than 6 hours before sex, you fill the diaphragm with a spermicidal jelly or cream and put it into the vagina.   The intrauterine device (IUD) is a small plastic device containing copper or hormones. The IUD prevents the egg from being fertilized or implanting and growing in the uterus. An IUD is put into the uterus by your healthcare provider. Depending on the type, it may be kept in the uterus 5 to 10 years before it must be replaced with a new one.  You can buy condoms, spermicides, and sponges at drug and grocery stores without a prescription. A diaphragm needs to be fitted by a healthcare provider. If you choose to use an IUD, you will need to see your provider for insertion of the IUD.  Sterilization  Sterilization is a procedure done to close the tubes that carry the sperm or eggs. It may be done with surgery or special devices put into the tubes. A woman or man who is sterilized will no longer be able to have children. These procedures are usually permanent methods of birth control.   Removal of the uterus (hysterectomy) causes a woman to be sterile and unable to get pregnant. Hysterectomy is usually only done if there are problems with the female organs, such as fibroids or abnormal menstrual bleeding.  Natural Family Planning (Periodic Abstinence)  The natural family planning methods of birth control do not use any devices, drugs, or surgery. To prevent pregnancy you avoid having sex on certain days of each menstrual cycle when a women is fertile which is determined by observing vaginal mucous signs, and confirming with the temperature rise that comes with ovulation and keeping track of monthly period patterns.  It is recommended to take classes to learn this process.  Withdrawal Method  The  withdrawal method involves removing the penis from the vagina before ejaculation, when semen starts coming out. Often sperm get into the vagina before or during withdrawal. This method is unreliable. It often does not prevent pregnancy.  Emergency Contraception  A pill for emergency birth control may be taken to prevent pregnancy soon after you have had sex without birth control. It may also be used when another method of birth control has failed (for example, if a condom breaks). The pill contains a hormone that will prevent pregnancy if it is taken very soon after intercourse (within 3 to 5 days, depending on the medicine). In many areas, the pills are available at drug stores without a prescription for women over 18 years old.   A copper intrauterine device (IUD) is another type of emergency birth control. The IUD may be put into the uterus by your healthcare provider within 5 days after unprotected sex. It may prevent pregnancy by stopping fertilization or keeping a fertilized egg from attaching to the womb.  How well do the various methods prevent pregnancy?   The following chart shows the typical failure rates of the different kinds of birth control methods. The failure rate is the number of pregnancies expected per 100 women during 1 year of using each method. The failure rate can depend on how correctly each method is followed. If a method is used perfectly, the failure rate is lower than the typical rate shown here. Use of more than 1 method (for example, birth control pills and condoms) can decrease the chances of failure.                               Percentage of Women Having an     Birth Control             Unintended Pregnancy within the        Method                       First Year of Use    -----------------------------------------------------------                                   Typical Use    Perfect Use    -----------------------------------------------------------   Spermicides                          29%          18%   Natural Family Planning     (Periodic Abstinence)                           9%     Symptothermal method                            2%   Withdrawal                          18%           4%   Diaphragm with Spermicide           16%           6%   Condom     Female                            27%           5%     Male                              17%           2%   Sponge     Women who have given birth        32%          26%     Women who have not given birth    16%           9%   Pill                                 9%           0.3%   IUD     with copper                        1%           0.6%     with hormones                      0.1%         0.1%   Shot (Depo-Provera)                  7%           0.3%   Implant                              1%           0.05%   Patch (Ortho Evra)                   8%           0.3%   Vaginal ring (NuvaRing)              8%           0.3%   Female Sterilization                 0.7%         0.5%   Male Sterilization                   0.2%         0.1%   No Method                           85%          85%   -----------------------------------------------------------      Note: These failure rates are modified from the [employer] [place] January 2008. Facts on Contraceptive Use. Available at http://www.[employer].org/pubs/fb_contr_use.html          Follow-ups after your visit        Follow-up notes from your care team     Return in about 1 week (around 5/3/2017) for Birth control visit.      Who to contact     If you have questions or need follow up information about today's clinic visit or your schedule please contact [facility] directly at [phone].  Normal or non-critical lab and imaging results will be communicated to you by MyChart, letter or phone within 4 business days after the clinic has received the results. If you do not hear from us within 7 days, please contact the clinic through MyChart or phone. If you have  a critical or abnormal lab result, we will notify you by phone as soon as possible.  Submit refill requests through [employer] or call your pharmacy and they will forward the refill request to us. Please allow 3 business days for your refill to be completed.          Additional Information About Your Visit        [facility] Information     [facility] gives you secure access to your electronic health record. If you see a primary care provider, you can also send messages to your care team and make appointments. If you have questions, please call your primary care clinic.  If you do not have a primary care provider, please call [phone] and they will assist you.        Care EveryWhere ID     This is your Care EveryWhere ID. This could be used by other organizations to access your [facility] medical records  [id number]        Your Vitals Were     Pulse Pulse Oximetry BMI (Body Mass Index)             60 99% 24.78 kg/m2          Blood Pressure from Last 3 Encounters:   04/26/17 98/58   02/22/17 106/64   02/01/17 110/74    Weight from Last 3 Encounters:   04/26/17 141 lb (64 kg)   02/22/17 146 lb (66.2 kg)   02/01/17 151 lb 9.6 oz (68.8 kg)              We Performed the Following     CHLAMYDIA TRACHOMATIS PCR     NEISSERIA GONORRHOEA PCR          Today's Medication Changes          These changes are accurate as of: 4/26/17 10:48 AM.  If you have any questions, ask your nurse or doctor.               Start taking these medicines.        Dose/Directions    ibuprofen 600 MG tablet   Commonly known as:  ADVIL/MOTRIN   Used for:  Encounter for counseling regarding contraception   Started by:  Jenny Bonds APRN CNM        Dose:  600 mg   Take 1 tablet (600 mg) by mouth every 6 hours as needed for moderate pain   Quantity:  60 tablet   Refills:  1       misoprostol 200 MCG tablet   Commonly known as:  CYTOTEC   Started by:  Jenny Bonds APRN CNM        Dose:  200 mcg   Take 1 tablet (200 mcg) by mouth once for 1  dose Take by mouth at least 4 hours prior to procedure   Quantity:  1 tablet   Refills:  0         Stop taking these medicines if you haven't already. Please contact your care team if you have questions.     etonogestrel 68 MG Impl   Commonly known as:  IMPLANON/NEXPLANON   Stopped by:  Jenny Bonds APRN CNM                Where to get your medicines      These medications were sent to [id number] [facility] - [place], [place] - [address]  [address], [place] [address]     Phone:  [phone]     ibuprofen 600 MG tablet    misoprostol 200 MCG tablet                Primary Care Provider Office Phone # Fax #    Deqa Meaghan Keys [phone]       [facility] [address]  [facility] [address]        Thank you!     Thank you for choosing [facility]  for your care. Our goal is always to provide you with excellent care. Hearing back from our patients is one way we can continue to improve our services. Please take a few minutes to complete the written survey that you may receive in the mail after your visit with us. Thank you!             Your Updated Medication List - Protect others around you: Learn how to safely use, store and throw away your medicines at www.disposemymeds.org.          This list is accurate as of: 4/26/17 10:48 AM.  Always use your most recent med list.                   Brand Name Dispense Instructions for use    gabapentin 300 MG capsule    NEURONTIN     TAKE ONE CAPSULE BY MOUTH EVERY MORNING AND TWO CAPSULES NIGHTLY AT BEDTIME       ibuprofen 600 MG tablet    ADVIL/MOTRIN    60 tablet    Take 1 tablet (600 mg) by mouth every 6 hours as needed for moderate pain       lamoTRIgine 200 MG tablet    LaMICtal     Take 200 mg by mouth daily       METHADONE HCL PO      Take 95 mLs by mouth daily [facility]       misoprostol 200 MCG tablet    CYTOTEC    1 tablet    Take 1 tablet (200 mcg) by mouth  once for 1 dose Take by mouth at least 4 hours prior to procedure       order for DME     1 Device    Equipment being ordered: left wrist brace

## 2017-04-26 NOTE — PROGRESS NOTES
"SUBJECTIVE:   Angela Vicente is a 36 year old who presents to the clinic for discussion of birth control methods.   She currently has Nexplanon and has had it for 2 months.  She states that since placement, she has had significant changes in her mood including increased irritability, short tempered, and \"feels crazy\".  She has seen her psychiatrist and changed medication dosages without change in mood.  Patient desires for nexplanon to be removed and would like to have Paragard IUD placed.  She desires LARC method that is non-hormonal.  She has used the following methods in the past: Mirena IUD and Nexplanon  Today she is interested in discussing Paragard IUD  Histories reviewed and updated  Past Medical History:   Diagnosis Date     Concussion     high school cheer leading     Depression with anxiety 9/19/2016     Heroin addiction (H)- on methadone rx ([facility]) 9/19/2016     Past Surgical History:   Procedure Laterality Date     ----------INCISION AND DRAINAGE      left wrist      CYSTOSCOPY, RETROGRADES, EXTRACT STONE, COMBINED  11/2/2012    Procedure: COMBINED CYSTOSCOPY, RETROGRADES, EXTRACT STONE;;  Surgeon: Gil Bar MD;  Location: [facility] OR     LASER HOLMIUM LITHOTRIPSY URETER(S), INSERT STENT, COMBINED  11/2/2012    Procedure: COMBINED CYSTOSCOPY, URETEROSCOPY, LASER HOLMIUM LITHOTRIPSY URETER(S), INSERT STENT;  CYSTOSCOPY, RIGHT URETEROSCOPY, HOLMIUM LASER, RIGHT URETERAL STONE EXTRACTION , RIGHT RETROGRADES AND RIGHT STENT PLACEMENT;  Surgeon: Gil Bar MD;  Location: [facility] OR     right uteral stent       Social History     Social History     Marital status: Single     Spouse name: N/A     Number of children: N/A     Years of education: N/A     Occupational History     Not on file.     Social History Main Topics     Smoking status: Former Smoker     Types: Cigarettes     Smokeless tobacco: Never Used     Alcohol use No     Drug use: No      Comment: heroin - stopped heroin " in August 2015 and was put on Methadone      Sexual activity: Yes     Birth control/ protection: Implant     Other Topics Concern     Not on file     Social History Narrative     Family History   Problem Relation Age of Onset     Depression Mother      PTSD anxiety     Anxiety Disorder Mother      Bipolar Disorder Mother      Alcohol/Drug Father      Depression Father      Anxiety Disorder Father      Substance Abuse Father      Anxiety Disorder Sister      HEART DISEASE Paternal Grandfather        Menstrual History:  Menses are irregular d/t nexplanon  Length of menses: 1-3  days  Menstrual description: variable    Denies the following contraindications to estrogen/progesterone combined contraception:  Migraine with aura  Smoking over age 35  Liver disease  Personal history of blood clot or stroke   History of heart disease  History of breast cancer  Undiagnosed vaginal bleeding  Hypertension  Pregnancy    Health maintenance updated:  yes    ROS:   12 point review of systems negative other than symptoms noted below.  Psychiatric: Anxiety, Holly and doesn't feel like herself    EXAM:  BP 98/58 (BP Location: Right arm, Patient Position: Chair, Cuff Size: Adult Regular)  Pulse 60  Wt 141 lb (64 kg)  SpO2 99%  BMI 24.78 kg/m2  Body mass index is 24.78 kg/(m^2).    ASSESSMENT/PLAN:    ICD-10-CM    1. Encounter for Nexplanon removal Z30.46    2. Encounter for counseling regarding contraception Z30.9 ibuprofen (ADVIL/MOTRIN) 600 MG tablet   3. Routine screening for STI (sexually transmitted infection) Z11.3 NEISSERIA GONORRHOEA PCR     CHLAMYDIA TRACHOMATIS PCR     There are no contraindications to the use of Paragard IUD    COUNSELING:  Reviewed risks and benefits of contraceptive use  Counseled on SE of paragard  Discussed proper use of chosen method  Handouts/Instrucions provided      INDICATIONS:                                                      [place] is here today for removal of Nexplanon contraceptive  implant.     Is a pregnancy test required: No.  Was a consent obtained?  Yes    Today's PHQ-2 Score:   PHQ-2 ( 1999 Pf[employer]) 4/26/2017   Q1: Little interest or pleasure in doing things 0   Q2: Feeling down, depressed or hopeless 0   PHQ-2 Score 0       PROCEDURE:                                                      Patient was placed in dorsal supine position with right arm abducted and externally rotated. Nexplanon was palpated under skin. The area was cleansed with betadine. The distal site was injected with 2 ml of 1% plain lidocaine.  While pushing down on the proximal end, 2 mm incision was made over the distal implant with a scalpel. The implant was grasped with a mosquito forceps and removed intact. The skin was closed with Steristrip. A pressure bandage was placed for the next 12-24 hours.  She tolerated the procedure well. There were no complications. Patient was discharged in stable condition.    Call if bleeding, pain or fever occur., Birth control counseling given., Reinsertion of IUD scheduled., Handouts were given to the patient. and Counseled to take PO cytotec at least 4 hr prior to IUD insertion if not on menstural cycle.  Also counseled to take ibuprofen as directed prior to IUD insertion.     return to clinic 1-2 weeks for Paragard insertion.    25 minutes was spent face to face with the patient today discussing her history, diagnosis, and follow-up plan as noted above. Over 50% of the visit was spent in counseling and coordination of care.    Total Visit Time: 25 minutes.     CAROLYN Dukes CNM

## 2017-04-27 LAB
C TRACH DNA SPEC QL NAA+PROBE: NORMAL
N GONORRHOEA DNA SPEC QL NAA+PROBE: NORMAL
SPECIMEN SOURCE: NORMAL
SPECIMEN SOURCE: NORMAL

## 2018-03-12 ENCOUNTER — TELEPHONE (OUTPATIENT)
Dept: LAB | Facility: CLINIC | Age: 38
End: 2018-03-12

## 2020-02-24 ENCOUNTER — HEALTH MAINTENANCE LETTER (OUTPATIENT)
Age: 40
End: 2020-02-24

## 2020-12-13 ENCOUNTER — HEALTH MAINTENANCE LETTER (OUTPATIENT)
Age: 40
End: 2020-12-13

## 2021-04-17 ENCOUNTER — HEALTH MAINTENANCE LETTER (OUTPATIENT)
Age: 41
End: 2021-04-17

## 2021-09-26 ENCOUNTER — HEALTH MAINTENANCE LETTER (OUTPATIENT)
Age: 41
End: 2021-09-26

## 2022-05-08 ENCOUNTER — HEALTH MAINTENANCE LETTER (OUTPATIENT)
Age: 42
End: 2022-05-08

## 2023-04-23 ENCOUNTER — HEALTH MAINTENANCE LETTER (OUTPATIENT)
Age: 43
End: 2023-04-23

## 2023-06-02 ENCOUNTER — HEALTH MAINTENANCE LETTER (OUTPATIENT)
Age: 43
End: 2023-06-02

## 2024-02-11 ENCOUNTER — HEALTH MAINTENANCE LETTER (OUTPATIENT)
Age: 44
End: 2024-02-11